# Patient Record
Sex: MALE | NOT HISPANIC OR LATINO | Employment: UNEMPLOYED | URBAN - METROPOLITAN AREA
[De-identification: names, ages, dates, MRNs, and addresses within clinical notes are randomized per-mention and may not be internally consistent; named-entity substitution may affect disease eponyms.]

---

## 2018-04-12 ENCOUNTER — APPOINTMENT (OUTPATIENT)
Dept: CT IMAGING | Facility: HOSPITAL | Age: 53
End: 2018-04-12

## 2018-04-12 ENCOUNTER — HOSPITAL ENCOUNTER (EMERGENCY)
Facility: HOSPITAL | Age: 53
Discharge: HOME | End: 2018-04-12
Attending: EMERGENCY MEDICINE | Admitting: EMERGENCY MEDICINE

## 2018-04-12 VITALS
HEART RATE: 64 BPM | RESPIRATION RATE: 18 BRPM | OXYGEN SATURATION: 97 % | TEMPERATURE: 98.7 F | SYSTOLIC BLOOD PRESSURE: 105 MMHG | DIASTOLIC BLOOD PRESSURE: 71 MMHG | WEIGHT: 140 LBS | BODY MASS INDEX: 21.22 KG/M2 | HEIGHT: 68 IN

## 2018-04-12 DIAGNOSIS — R11.10 VOMITING AND DIARRHEA: ICD-10-CM

## 2018-04-12 DIAGNOSIS — R10.13 EPIGASTRIC ABDOMINAL PAIN: Primary | ICD-10-CM

## 2018-04-12 DIAGNOSIS — R19.7 VOMITING AND DIARRHEA: ICD-10-CM

## 2018-04-12 LAB
ALBUMIN SERPL-MCNC: 3.9 G/DL (ref 3.5–5.2)
ALBUMIN/GLOB SERPL: 1.1 G/DL
ALP SERPL-CCNC: 84 U/L (ref 39–117)
ALT SERPL W P-5'-P-CCNC: 19 U/L (ref 1–41)
ANION GAP SERPL CALCULATED.3IONS-SCNC: 11.2 MMOL/L
AST SERPL-CCNC: 11 U/L (ref 1–40)
BASOPHILS # BLD AUTO: 0.02 10*3/MM3 (ref 0–0.2)
BASOPHILS NFR BLD AUTO: 0.2 % (ref 0–1.5)
BILIRUB SERPL-MCNC: 0.4 MG/DL (ref 0.1–1.2)
BILIRUB UR QL STRIP: NEGATIVE
BUN BLD-MCNC: 9 MG/DL (ref 6–20)
BUN/CREAT SERPL: 14.1 (ref 7–25)
CALCIUM SPEC-SCNC: 9.4 MG/DL (ref 8.6–10.5)
CHLORIDE SERPL-SCNC: 103 MMOL/L (ref 98–107)
CLARITY UR: CLEAR
CO2 SERPL-SCNC: 26.8 MMOL/L (ref 22–29)
COLOR UR: YELLOW
CREAT BLD-MCNC: 0.64 MG/DL (ref 0.76–1.27)
DEPRECATED RDW RBC AUTO: 40.8 FL (ref 37–54)
EOSINOPHIL # BLD AUTO: 0.34 10*3/MM3 (ref 0–0.7)
EOSINOPHIL NFR BLD AUTO: 3.8 % (ref 0.3–6.2)
ERYTHROCYTE [DISTWIDTH] IN BLOOD BY AUTOMATED COUNT: 12.3 % (ref 11.5–14.5)
GFR SERPL CREATININE-BSD FRML MDRD: 131 ML/MIN/1.73
GFR SERPL CREATININE-BSD FRML MDRD: >150 ML/MIN/1.73
GLOBULIN UR ELPH-MCNC: 3.6 GM/DL
GLUCOSE BLD-MCNC: 95 MG/DL (ref 65–99)
GLUCOSE UR STRIP-MCNC: NEGATIVE MG/DL
HCT VFR BLD AUTO: 52.6 % (ref 40.4–52.2)
HGB BLD-MCNC: 16.9 G/DL (ref 13.7–17.6)
HGB UR QL STRIP.AUTO: NEGATIVE
IMM GRANULOCYTES # BLD: 0.02 10*3/MM3 (ref 0–0.03)
IMM GRANULOCYTES NFR BLD: 0.2 % (ref 0–0.5)
KETONES UR QL STRIP: NEGATIVE
LEUKOCYTE ESTERASE UR QL STRIP.AUTO: NEGATIVE
LIPASE SERPL-CCNC: 43 U/L (ref 13–60)
LYMPHOCYTES # BLD AUTO: 2.35 10*3/MM3 (ref 0.9–4.8)
LYMPHOCYTES NFR BLD AUTO: 26.1 % (ref 19.6–45.3)
MCH RBC QN AUTO: 29.2 PG (ref 27–32.7)
MCHC RBC AUTO-ENTMCNC: 32.1 G/DL (ref 32.6–36.4)
MCV RBC AUTO: 90.8 FL (ref 79.8–96.2)
MONOCYTES # BLD AUTO: 1.15 10*3/MM3 (ref 0.2–1.2)
MONOCYTES NFR BLD AUTO: 12.8 % (ref 5–12)
NEUTROPHILS # BLD AUTO: 5.13 10*3/MM3 (ref 1.9–8.1)
NEUTROPHILS NFR BLD AUTO: 56.9 % (ref 42.7–76)
NITRITE UR QL STRIP: NEGATIVE
PH UR STRIP.AUTO: 8 [PH] (ref 5–8)
PLATELET # BLD AUTO: 125 10*3/MM3 (ref 140–500)
PMV BLD AUTO: 10.9 FL (ref 6–12)
POTASSIUM BLD-SCNC: 5 MMOL/L (ref 3.5–5.2)
PROT SERPL-MCNC: 7.5 G/DL (ref 6–8.5)
PROT UR QL STRIP: NEGATIVE
RBC # BLD AUTO: 5.79 10*6/MM3 (ref 4.6–6)
SODIUM BLD-SCNC: 141 MMOL/L (ref 136–145)
SP GR UR STRIP: 1.01 (ref 1–1.03)
TROPONIN T SERPL-MCNC: <0.01 NG/ML (ref 0–0.03)
UROBILINOGEN UR QL STRIP: NORMAL
WBC NRBC COR # BLD: 9.01 10*3/MM3 (ref 4.5–10.7)

## 2018-04-12 PROCEDURE — 83690 ASSAY OF LIPASE: CPT | Performed by: EMERGENCY MEDICINE

## 2018-04-12 PROCEDURE — 93010 ELECTROCARDIOGRAM REPORT: CPT | Performed by: INTERNAL MEDICINE

## 2018-04-12 PROCEDURE — 80053 COMPREHEN METABOLIC PANEL: CPT | Performed by: EMERGENCY MEDICINE

## 2018-04-12 PROCEDURE — 74177 CT ABD & PELVIS W/CONTRAST: CPT

## 2018-04-12 PROCEDURE — 84484 ASSAY OF TROPONIN QUANT: CPT | Performed by: EMERGENCY MEDICINE

## 2018-04-12 PROCEDURE — 25010000002 IOPAMIDOL 61 % SOLUTION: Performed by: EMERGENCY MEDICINE

## 2018-04-12 PROCEDURE — 93005 ELECTROCARDIOGRAM TRACING: CPT | Performed by: EMERGENCY MEDICINE

## 2018-04-12 PROCEDURE — 85025 COMPLETE CBC W/AUTO DIFF WBC: CPT | Performed by: EMERGENCY MEDICINE

## 2018-04-12 PROCEDURE — 25010000002 HYDROMORPHONE PER 4 MG: Performed by: EMERGENCY MEDICINE

## 2018-04-12 PROCEDURE — 81003 URINALYSIS AUTO W/O SCOPE: CPT | Performed by: EMERGENCY MEDICINE

## 2018-04-12 PROCEDURE — 99285 EMERGENCY DEPT VISIT HI MDM: CPT

## 2018-04-12 PROCEDURE — 96374 THER/PROPH/DIAG INJ IV PUSH: CPT

## 2018-04-12 PROCEDURE — 96375 TX/PRO/DX INJ NEW DRUG ADDON: CPT

## 2018-04-12 PROCEDURE — 25010000002 ONDANSETRON PER 1 MG: Performed by: EMERGENCY MEDICINE

## 2018-04-12 RX ORDER — OMEPRAZOLE 40 MG/1
40 CAPSULE, DELAYED RELEASE ORAL DAILY
Qty: 30 CAPSULE | Refills: 0 | Status: SHIPPED | OUTPATIENT
Start: 2018-04-12 | End: 2021-11-15

## 2018-04-12 RX ORDER — ATENOLOL 25 MG/1
25 TABLET ORAL DAILY
COMMUNITY

## 2018-04-12 RX ORDER — HYDROCODONE BITARTRATE AND ACETAMINOPHEN 5; 325 MG/1; MG/1
1-2 TABLET ORAL EVERY 6 HOURS PRN
Qty: 15 TABLET | Refills: 0 | Status: SHIPPED | OUTPATIENT
Start: 2018-04-12

## 2018-04-12 RX ORDER — ONDANSETRON 2 MG/ML
4 INJECTION INTRAMUSCULAR; INTRAVENOUS ONCE
Status: COMPLETED | OUTPATIENT
Start: 2018-04-12 | End: 2018-04-12

## 2018-04-12 RX ORDER — HYDROMORPHONE HYDROCHLORIDE 1 MG/ML
0.5 INJECTION, SOLUTION INTRAMUSCULAR; INTRAVENOUS; SUBCUTANEOUS ONCE
Status: COMPLETED | OUTPATIENT
Start: 2018-04-12 | End: 2018-04-12

## 2018-04-12 RX ORDER — SIMVASTATIN 10 MG
10 TABLET ORAL NIGHTLY
COMMUNITY

## 2018-04-12 RX ORDER — ALUMINA, MAGNESIA, AND SIMETHICONE 2400; 2400; 240 MG/30ML; MG/30ML; MG/30ML
15 SUSPENSION ORAL ONCE
Status: COMPLETED | OUTPATIENT
Start: 2018-04-12 | End: 2018-04-12

## 2018-04-12 RX ORDER — ONDANSETRON 4 MG/1
4 TABLET, ORALLY DISINTEGRATING ORAL 4 TIMES DAILY PRN
Qty: 10 TABLET | Refills: 0 | Status: SHIPPED | OUTPATIENT
Start: 2018-04-12

## 2018-04-12 RX ADMIN — IOPAMIDOL 93 ML: 612 INJECTION, SOLUTION INTRAVENOUS at 13:10

## 2018-04-12 RX ADMIN — HYDROMORPHONE HYDROCHLORIDE 0.5 MG: 1 INJECTION, SOLUTION INTRAMUSCULAR; INTRAVENOUS; SUBCUTANEOUS at 12:16

## 2018-04-12 RX ADMIN — ALUMINUM HYDROXIDE, MAGNESIUM HYDROXIDE, AND DIMETHICONE 15 ML: 400; 400; 40 SUSPENSION ORAL at 14:25

## 2018-04-12 RX ADMIN — SODIUM CHLORIDE 1000 ML: 9 INJECTION, SOLUTION INTRAVENOUS at 12:12

## 2018-04-12 RX ADMIN — LIDOCAINE HYDROCHLORIDE 15 ML: 20 SOLUTION ORAL; TOPICAL at 14:24

## 2018-04-12 RX ADMIN — ONDANSETRON 4 MG: 2 INJECTION INTRAMUSCULAR; INTRAVENOUS at 12:13

## 2018-04-12 NOTE — ED PROVIDER NOTES
" EMERGENCY DEPARTMENT ENCOUNTER    Room Number:  19/19  Date seen:  4/12/2018  Time seen: 11:55 AM  PCP: Geri Kelley MD  Historian: Patient, Paramedics  History Limited By: Language Barrier (translation service used)      HPI:  Chief Complaint: Abdominal Pain  Context: Kenisha Esteban is a 53 y.o. male who presents to the ED c/o intermittent episodes of periumbilical abdominal pain onset about 10 days ago. Pt's abdominal pain has gradually worsened since initial onset. Pt has also had N/V/D and decreased appetite/oral intake. Pt has had no abdominal surgeries performed in the past. There are no other complaints at this time.     Pain Location: Periumbilical region of the abdomen  Radiation: None  Quality: \"aching\"  Intensity/Severity: Moderate  Duration: Onset about 10 days ago  Onset quality: Gradual  Timing: Intermittent  Progression: Worsening   Aggravating Factors: Nothing  Alleviating Factors: Nothing  Previous Episodes: Unknown   Treatment before arrival: Unknown  Associated Symptoms: N/V/D, decreased appetite/oral intake        MEDICAL RECORD REVIEW    Pt has had no recent visits to [facility] ED.             PAST MEDICAL HISTORY  Active Ambulatory Problems     Diagnosis Date Noted   • No Active Ambulatory Problems     Resolved Ambulatory Problems     Diagnosis Date Noted   • No Resolved Ambulatory Problems     Past Medical History:   Diagnosis Date   • Myocardial infarction          PAST SURGICAL HISTORY  Past Surgical History:   Procedure Laterality Date   • CORONARY ANGIOPLASTY WITH STENT PLACEMENT           FAMILY HISTORY  History reviewed. No pertinent family history.      SOCIAL HISTORY  Social History     Social History   • Marital status:      Spouse name: N/A   • Number of children: N/A   • Years of education: N/A     Occupational History   • Not on file.     Social History Main Topics   • Smoking status: Current Every Day Smoker     Types: Cigarettes   • Smokeless tobacco: Not on file   • " Alcohol use No   • Drug use: Unknown   • Sexual activity: Not on file     Other Topics Concern   • Not on file     Social History Narrative   • No narrative on file         ALLERGIES  Review of patient's allergies indicates no known allergies.        REVIEW OF SYSTEMS  Review of Systems   Unable to perform ROS: Other (Language Barrier (translation service used))   Constitutional: Positive for appetite change (decreased appetite/decreased oral intake).   Gastrointestinal: Positive for abdominal pain, diarrhea, nausea and vomiting.            PHYSICAL EXAM  ED Triage Vitals   Temp Heart Rate Resp BP SpO2   04/12/18 1155 04/12/18 1151 04/12/18 1152 04/12/18 1152 04/12/18 1152   98.7 °F (37.1 °C) 80 22 136/85 95 %      Temp src Heart Rate Source Patient Position BP Location FiO2 (%)   04/12/18 1155 04/12/18 1151 -- -- --   Oral Monitor            Physical Exam   Constitutional: He is oriented to person, place, and time. No distress.   HENT:   Head: Normocephalic.   Mouth/Throat: Mucous membranes are normal.   Eyes: EOM are normal. Pupils are equal, round, and reactive to light.   Neck: Normal range of motion. Neck supple.   Cardiovascular: Normal rate, regular rhythm and normal heart sounds.    Pulmonary/Chest: Effort normal and breath sounds normal. No respiratory distress. He has no decreased breath sounds. He has no wheezes. He has no rhonchi. He has no rales.   Abdominal: Soft. There is tenderness in the periumbilical area. There is no rebound and no guarding.   Musculoskeletal: Normal range of motion.   Neurological: He is alert and oriented to person, place, and time. He has normal sensation.   Skin: Skin is warm and dry.   Psychiatric: Mood and affect normal.   Nursing note and vitals reviewed.          LAB RESULTS  Recent Results (from the past 24 hour(s))   Comprehensive Metabolic Panel    Collection Time: 04/12/18 12:08 PM   Result Value Ref Range    Glucose 95 65 - 99 mg/dL    BUN 9 6 - 20 mg/dL    Creatinine  0.64 (L) 0.76 - 1.27 mg/dL    Sodium 141 136 - 145 mmol/L    Potassium 5.0 3.5 - 5.2 mmol/L    Chloride 103 98 - 107 mmol/L    CO2 26.8 22.0 - 29.0 mmol/L    Calcium 9.4 8.6 - 10.5 mg/dL    Total Protein 7.5 6.0 - 8.5 g/dL    Albumin 3.90 3.50 - 5.20 g/dL    ALT (SGPT) 19 1 - 41 U/L    AST (SGOT) 11 1 - 40 U/L    Alkaline Phosphatase 84 39 - 117 U/L    Total Bilirubin 0.4 0.1 - 1.2 mg/dL    eGFR Non African Amer 131 >60 mL/min/1.73    eGFR  African Amer >150 >60 mL/min/1.73    Globulin 3.6 gm/dL    A/G Ratio 1.1 g/dL    BUN/Creatinine Ratio 14.1 7.0 - 25.0    Anion Gap 11.2 mmol/L   Lipase    Collection Time: 04/12/18 12:08 PM   Result Value Ref Range    Lipase 43 13 - 60 U/L   Troponin    Collection Time: 04/12/18 12:08 PM   Result Value Ref Range    Troponin T <0.010 0.000 - 0.030 ng/mL   CBC Auto Differential    Collection Time: 04/12/18 12:08 PM   Result Value Ref Range    WBC 9.01 4.50 - 10.70 10*3/mm3    RBC 5.79 4.60 - 6.00 10*6/mm3    Hemoglobin 16.9 13.7 - 17.6 g/dL    Hematocrit 52.6 (H) 40.4 - 52.2 %    MCV 90.8 79.8 - 96.2 fL    MCH 29.2 27.0 - 32.7 pg    MCHC 32.1 (L) 32.6 - 36.4 g/dL    RDW 12.3 11.5 - 14.5 %    RDW-SD 40.8 37.0 - 54.0 fl    MPV 10.9 6.0 - 12.0 fL    Platelets 125 (L) 140 - 500 10*3/mm3    Neutrophil % 56.9 42.7 - 76.0 %    Lymphocyte % 26.1 19.6 - 45.3 %    Monocyte % 12.8 (H) 5.0 - 12.0 %    Eosinophil % 3.8 0.3 - 6.2 %    Basophil % 0.2 0.0 - 1.5 %    Immature Grans % 0.2 0.0 - 0.5 %    Neutrophils, Absolute 5.13 1.90 - 8.10 10*3/mm3    Lymphocytes, Absolute 2.35 0.90 - 4.80 10*3/mm3    Monocytes, Absolute 1.15 0.20 - 1.20 10*3/mm3    Eosinophils, Absolute 0.34 0.00 - 0.70 10*3/mm3    Basophils, Absolute 0.02 0.00 - 0.20 10*3/mm3    Immature Grans, Absolute 0.02 0.00 - 0.03 10*3/mm3   Urinalysis With / Microscopic If Indicated - Urine, Clean Catch    Collection Time: 04/12/18  1:27 PM   Result Value Ref Range    Color, UA Yellow Yellow, Straw    Appearance, UA Clear Clear    pH, UA  8.0 5.0 - 8.0    Specific Gravity, UA 1.008 1.005 - 1.030    Glucose, UA Negative Negative    Ketones, UA Negative Negative    Bilirubin, UA Negative Negative    Blood, UA Negative Negative    Protein, UA Negative Negative    Leuk Esterase, UA Negative Negative    Nitrite, UA Negative Negative    Urobilinogen, UA 0.2 E.U./dL 0.2 - 1.0 E.U./dL       Ordered the above labs and reviewed the results.        RADIOLOGY  CT Abdomen Pelvis With Contrast   Final Result   Unremarkable CT scan of the abdomen and pelvis except for   evidence of atherosclerotic disease as noted.       This report was finalized on 4/12/2018 1:44 PM by Dr. Nick Merritt MD.                 Ordered the above noted radiological studies. Reviewed by me in PACS. Spoke with Dr. Merritt (radiologist) regarding CT Abd scan results.        PROCEDURES  Procedures        EKG:           EKG time: 11:52 AM  Rhythm/Rate: NSR rate 74  P waves and TX: Normal P waves  QRS, axis: LVH   ST and T waves: Normal ST     Interpreted Contemporaneously by me, independently viewed  No old EKG is available for comparison           PROGRESS AND CONSULTS  ED Course   Comment By Time   2:34 PM  History is difficult as he has no family here and does not speak any English.  History through  phone.  No records here or [facility]. States he has had this in the past and endoscopy showed an inflammed stomach.  His workup here is normal and does not look like someone who has been vomiting for 10 days.  Most likely secondary to gastritis.  Given fluids here.  Will discharge with GI follow up.   Cristian Ly MD 04/12 0066     12:02 PM:  UA and blood work ordered for further evaluation. IV fluids ordered to hydrate pt. Dilaudid and Zofran ordered to treat for abdominal pain and nausea.     12:53 PM:  CT Abd ordered for further evaluation.     1:44 PM:  GI cocktail ordered to treat for abdominal discomfort.     2:25 PM:  Rechecked pt. Informed pt (via translation service)  that pt's CMP is relatively unremarkable. WBC is WNL. CT Abd is negative acute. Pt will be prescribed with rx for PPI, antiemetic, and small amount of pain medicine for discomfort. Pt was advised to f/u with PMD; pt will be provided with f/u referral to GI. RTER warnings given. Pt agrees with plan for discharge.            MEDICAL DECISION MAKING      MDM  Number of Diagnoses or Management Options  Epigastric abdominal pain:   Vomiting and diarrhea:      Amount and/or Complexity of Data Reviewed  Clinical lab tests: reviewed and ordered (Troponin is negative.)  Tests in the radiology section of CPT®: ordered and reviewed (CT Abd:  Unremarkable CT scan of the abdomen and pelvis except for  evidence of atherosclerotic disease as noted.)  Tests in the medicine section of CPT®: ordered and reviewed (EKG interpreted.   )  Discussion of test results with the performing providers: yes (CT Abd results d/w radiologist.   )    Patient Progress  Patient progress: stable             DIAGNOSIS  Final diagnoses:   Epigastric abdominal pain   Vomiting and diarrhea         DISPOSITION  Pt discharged.      DISCHARGE    Patient discharged in stable condition.    Reviewed implications of results, diagnosis, meds, responsibility to follow up, warning signs and symptoms of possible worsening, potential complications and reasons to return to ER.    Patient/Family voiced understanding of above instructions.    Discussed plan for discharge, as there is no emergent indication for admission. Pt/family is agreeable and understands need for follow up and repeat testing. Pt is aware that discharge does not mean that nothing is wrong but it indicates no emergency is present that requires admission and they must continue care with follow-up as given below or physician of their choice.     FOLLOW-UP  Geri Kelley MD  [address] [facility] [address] [phone]    In 2 days      Libia Damian MD  [address]  [place] [address]  [phone]    Schedule an appointment as soon as possible for a visit           Medication List      New Prescriptions    HYDROcodone-acetaminophen 5-325 MG per tablet  Commonly known as:  NORCO  Take 1-2 tablets by mouth Every 6 (Six) Hours As Needed for Moderate Pain   .     omeprazole 40 MG capsule  Commonly known as:  priLOSEC  Take 1 capsule by mouth Daily.     ondansetron ODT 4 MG disintegrating tablet  Commonly known as:  ZOFRAN-ODT  Take 1 tablet by mouth 4 (Four) Times a Day As Needed for Nausea or   Vomiting.                        Latest Documented Vital Signs:  As of 3:32 PM  BP- 105/71 HR- 64 Temp- 98.7 °F (37.1 °C) (Oral) O2 sat- 97%        --  Documentation assistance provided by tevin Avilez for Dr. Malachi MD.  Information recorded by the scribe was done at my direction and has been verified and validated by me.               Cortez Avilez  04/12/18 [id number]       Cristian Ly MD  04/12/18 [id number]

## 2018-04-12 NOTE — ED TRIAGE NOTES
Pt to ED via EMS from home, EMS reports pt c/o periumbilical abdominal pain x1 week, onset of vomiting x24 hours, no oral intake without vomiting x10 days. Per EMS noted pulsating area to left of umbilicus.

## 2018-04-23 ENCOUNTER — OFFICE VISIT (OUTPATIENT)
Dept: GASTROENTEROLOGY | Facility: CLINIC | Age: 53
End: 2018-04-23

## 2018-04-23 VITALS
HEIGHT: 68 IN | BODY MASS INDEX: 26.86 KG/M2 | DIASTOLIC BLOOD PRESSURE: 80 MMHG | WEIGHT: 177.2 LBS | SYSTOLIC BLOOD PRESSURE: 116 MMHG | TEMPERATURE: 98.3 F

## 2018-04-23 DIAGNOSIS — R14.0 ABDOMINAL BLOATING: Primary | ICD-10-CM

## 2018-04-23 DIAGNOSIS — R10.30 LOWER ABDOMINAL PAIN: ICD-10-CM

## 2018-04-23 DIAGNOSIS — R10.10 PAIN OF UPPER ABDOMEN: ICD-10-CM

## 2018-04-23 DIAGNOSIS — R11.2 NON-INTRACTABLE VOMITING WITH NAUSEA, UNSPECIFIED VOMITING TYPE: ICD-10-CM

## 2018-04-23 PROCEDURE — 99204 OFFICE O/P NEW MOD 45 MIN: CPT | Performed by: INTERNAL MEDICINE

## 2018-04-23 RX ORDER — DICYCLOMINE HYDROCHLORIDE 10 MG/1
CAPSULE ORAL
Refills: 0 | COMMUNITY
Start: 2018-04-14

## 2018-04-23 NOTE — PROGRESS NOTES
Chief Complaint   Patient presents with   • Vomiting   • Abdominal Pain       Kenisha Esteban is a 53 y.o. male who presents with Abdominal pain for 2 weeks, constipation, vomiting    EGD 5 years ago normal  Routine laboratories and CAT scan in the ER normal      Vomiting    This is a new problem. The current episode started 1 to 4 weeks ago. The problem occurs 2 to 4 times per day. The problem has been waxing and waning. The emesis has an appearance of stomach contents. Associated symptoms include abdominal pain, dizziness, sweats and weight loss. He has tried nothing for the symptoms.   Abdominal Pain   This is a new problem. The current episode started 1 to 4 weeks ago. The onset quality is sudden. The problem occurs intermittently. The problem has been waxing and waning. The pain is located in the RLQ and epigastric region. The pain is at a severity of 4/10. The pain is moderate. The quality of the pain is aching, cramping, dull and colicky. The abdominal pain does not radiate. Associated symptoms include anorexia, belching, constipation, flatus, nausea, vomiting and weight loss. The pain is aggravated by eating. The pain is relieved by nothing. He has tried proton pump inhibitors and oral narcotic analgesics for the symptoms. The treatment provided no relief. Prior diagnostic workup includes CT scan. There is no history of abdominal surgery, colon cancer, Crohn's disease, gallstones, GERD, irritable bowel syndrome, pancreatitis, PUD or ulcerative colitis.       Past Medical History:   Diagnosis Date   • Hypertension    • Myocardial infarction        Past Surgical History:   Procedure Laterality Date   • CORONARY ANGIOPLASTY WITH STENT PLACEMENT     • UPPER GASTROINTESTINAL ENDOSCOPY           Current Outpatient Prescriptions:   •  ASPIRIN 81 PO, Take  by mouth., Disp: , Rfl:   •  atenolol (TENORMIN) 25 MG tablet, Take 25 mg by mouth Daily., Disp: , Rfl:   •  dicyclomine (BENTYL) 10 MG capsule, TK 1 C PO  BID PRN, Disp: , Rfl: 0  •  omeprazole (priLOSEC) 40 MG capsule, Take 1 capsule by mouth Daily., Disp: 30 capsule, Rfl: 0  •  ondansetron ODT (ZOFRAN-ODT) 4 MG disintegrating tablet, Take 1 tablet by mouth 4 (Four) Times a Day As Needed for Nausea or Vomiting., Disp: 10 tablet, Rfl: 0  •  HYDROcodone-acetaminophen (NORCO) 5-325 MG per tablet, Take 1-2 tablets by mouth Every 6 (Six) Hours As Needed for Moderate Pain ., Disp: 15 tablet, Rfl: 0  •  simvastatin (ZOCOR) 10 MG tablet, Take 10 mg by mouth Every Night., Disp: , Rfl:     No Known Allergies    Social History     Social History   • Marital status:      Spouse name: N/A   • Number of children: N/A   • Years of education: N/A     Occupational History   • Not on file.     Social History Main Topics   • Smoking status: Current Every Day Smoker     Packs/day: 1.00     Types: Cigarettes   • Smokeless tobacco: Not on file   • Alcohol use No   • Drug use: Unknown   • Sexual activity: Not on file     Other Topics Concern   • Not on file     Social History Narrative   • No narrative on file       History reviewed. No pertinent family history.    Review of Systems   Constitutional: Positive for weight loss.   Gastrointestinal: Positive for abdominal pain, anorexia, constipation, flatus, nausea and vomiting.   Neurological: Positive for dizziness.   All other systems reviewed and are negative.      Vitals:    04/23/18 1532   BP: 116/80   Temp: 98.3 °F (36.8 °C)       Physical Exam   Constitutional: He is oriented to person, place, and time. He appears well-developed and well-nourished.   HENT:   Head: Normocephalic and atraumatic.   Eyes: Conjunctivae and EOM are normal.   Neck: Normal range of motion. No tracheal deviation present.   Cardiovascular: Normal rate and regular rhythm.    Pulmonary/Chest: Effort normal and breath sounds normal. No respiratory distress.   Abdominal: Soft. Bowel sounds are normal. He exhibits no distension and no mass. There is  tenderness. There is no rebound and no guarding.   Tender in the right lower quadrant and epigastric region without guarding or rebound   Musculoskeletal: Normal range of motion.   Neurological: He is alert and oriented to person, place, and time.   Skin: Skin is warm and dry.   Psychiatric: He has a normal mood and affect. Judgment normal.   Nursing note and vitals reviewed.      CT ABDOMEN PELVIS W CONTRAST-     CLINICAL HISTORY: Abdominal pain.     TECHNIQUE: Spiral CT images were obtained through the abdomen and pelvis  with IV contrast only and were reconstructed in 3 mm thick axial slices.     Radiation dose reduction techniques were utilized, including automated  exposure control and exposure modulation based on body size.     COMPARISON: None     FINDINGS: The liver, spleen, pancreas, kidneys, and adrenal glands  appear within normal limits. The liver, spleen, pancreas, kidneys, and  adrenal glands appear within normal limits. The stomach and small and  large bowel are unremarkable. There is no bowel distention. There are no  abnormal masses or fluid collections in the abdomen or pelvis. No  hernias are identified. Several calcified atherosclerotic plaques are  noted in the common and internal iliac arteries.     IMPRESSION:  Unremarkable CT scan of the abdomen and pelvis except for  evidence of atherosclerotic disease as noted.     This report was finalized on 4/12/2018 1:44 PM by Dr. Nick Merritt MD    I have visualized the imaging myself and agree there is no acute process in the abdomen, specifically no evidence of ulceration or diverticulitis or colitis    Problem list    Abdominal pain epigastric region  Abdominal pain right lower quadrant  Nausea and vomiting  Weight loss  Chronic constipation      Assessment/Plan    Plan for EGD and colonoscopy based on the above issues  Continue PPI as I think peptic ulcer disease is a likely possibility here  I will likely start a medication for chronic  constipation after the scopes  He may need gallbladder workup  He should avoid narcotics  He continues Bentyl as needed      Begin a fiber supplement.  Benefiber, Citrucel or Metamucil is acceptable.  Fiber gummies are also acceptable.  Please take 1 dose of fiber in the morning and 1 in the evening for 4 weeks and increase to 2 doses of fiber in the morning and 2 in the evening after that. Please  try to maintain a high-fiber diet.  We obtain 10 g of fiber from a high-fiber diet every day.  Women should consume a total of 25 grams of fiber a day, men 30 grams a day.  We can reach the total daily recommended dose of fiber a day utilizing the high-fiber diet and also fiber supplements.    Please begin a probiotic.  You can try activia yogurt, align, or florastor.  These can be found over-the-counter at a local grocery store.

## 2018-06-12 ENCOUNTER — OFFICE VISIT (OUTPATIENT)
Dept: GASTROENTEROLOGY | Facility: CLINIC | Age: 53
End: 2018-06-12

## 2018-06-12 VITALS
HEIGHT: 68 IN | WEIGHT: 176.2 LBS | DIASTOLIC BLOOD PRESSURE: 86 MMHG | SYSTOLIC BLOOD PRESSURE: 132 MMHG | BODY MASS INDEX: 26.7 KG/M2 | TEMPERATURE: 98 F

## 2018-06-12 DIAGNOSIS — R63.4 WEIGHT LOSS, ABNORMAL: ICD-10-CM

## 2018-06-12 DIAGNOSIS — K59.00 CONSTIPATION, UNSPECIFIED CONSTIPATION TYPE: ICD-10-CM

## 2018-06-12 DIAGNOSIS — R10.13 EPIGASTRIC PAIN: Primary | ICD-10-CM

## 2018-06-12 DIAGNOSIS — R11.15 INTRACTABLE CYCLICAL VOMITING WITH NAUSEA: ICD-10-CM

## 2018-06-12 PROCEDURE — 99214 OFFICE O/P EST MOD 30 MIN: CPT | Performed by: NURSE PRACTITIONER

## 2018-06-12 RX ORDER — POLYETHYLENE GLYCOL 3350 17 G/17G
17 POWDER, FOR SOLUTION ORAL DAILY
Qty: 225 G | Refills: 5 | Status: SHIPPED | OUTPATIENT
Start: 2018-06-12

## 2018-06-12 NOTE — PROGRESS NOTES
Chief Complaint   Patient presents with   • Bloated   • Abdominal Pain   • Heartburn   • Nausea   • Vomiting       Kenisha Esteban is a  53 y.o. male here for a follow up visit for abd pain and constipation.     HPI  54 yo kathleen fine presents today accompanied by his granddaughter for follow up visit for abd pain, constipation, N&V and weight loss. He is a patient of Dr. Smith. He was last seen in the office on 4/23/18. At that time given his symptoms and hx it was recommended he schedule EGD and Colonoscopy with Dr. Smith. Patient has hx AMI and CAD so he would first require cardiac clearance before proceeding with the scopes. He is here today to discuss this and to get started on some medication for his constipation. He admits he has been chronically constipated and does not currently take any meds to help him have a more normal, routine BM. He is still having upper and lower abd pain with occasional N&V. He has gained some weight recently. He has had a EGD in the past but cannot remember the year. He does not think he has ever had colonoscopy in the past. He denies any dysphagia, reflux, diarrhea, rectal bleeding or melena. He admits his appetite is good and his weight has improved.     Past Medical History:   Diagnosis Date   • Hyperlipidemia    • Hypertension    • Myocardial infarct 2008   • Myocardial infarction        Past Surgical History:   Procedure Laterality Date   • CORONARY ANGIOPLASTY WITH STENT PLACEMENT     • UPPER GASTROINTESTINAL ENDOSCOPY      inflammation per patient-  Al       Scheduled Meds:    Continuous Infusions:  No current facility-administered medications for this visit.     PRN Meds:.    No Known Allergies    Social History     Social History   • Marital status:      Spouse name: N/A   • Number of children: N/A   • Years of education: N/A     Occupational History   • Not on file.     Social History Main Topics   • Smoking status: Current Every Day Smoker     Packs/day:  1.00     Types: Cigarettes   • Smokeless tobacco: Never Used   • Alcohol use No   • Drug use: No   • Sexual activity: Not on file     Other Topics Concern   • Not on file     Social History Narrative   • No narrative on file       History reviewed. No pertinent family history.    Review of Systems   Constitutional: Negative for appetite change, chills, diaphoresis, fatigue, fever and unexpected weight change.   HENT: Negative for nosebleeds, postnasal drip, sore throat, trouble swallowing and voice change.    Respiratory: Negative for cough, choking, chest tightness, shortness of breath and wheezing.    Cardiovascular: Negative for chest pain.   Gastrointestinal: Positive for abdominal distention, abdominal pain, constipation and nausea. Negative for anal bleeding, blood in stool, diarrhea, rectal pain and vomiting.   Endocrine: Negative for polydipsia, polyphagia and polyuria.   Musculoskeletal: Negative for gait problem.   Skin: Negative for rash and wound.   Allergic/Immunologic: Negative for food allergies.   Neurological: Negative for dizziness, speech difficulty and light-headedness.   Psychiatric/Behavioral: Negative for confusion, self-injury, sleep disturbance and suicidal ideas.       Vitals:    06/12/18 1439   BP: 132/86   Temp: 98 °F (36.7 °C)       Physical Exam   Constitutional: He is oriented to person, place, and time. He appears well-developed and well-nourished. He does not appear ill. No distress.   HENT:   Head: Normocephalic.   Eyes: Pupils are equal, round, and reactive to light.   Cardiovascular: Normal rate, regular rhythm and normal heart sounds.    Pulmonary/Chest: Effort normal and breath sounds normal.   Abdominal: Soft. Bowel sounds are normal. He exhibits no distension and no mass. There is no hepatosplenomegaly. There is no tenderness. There is no rebound and no guarding. No hernia.   Musculoskeletal: Normal range of motion.   Neurological: He is alert and oriented to person, place,  and time.   Skin: Skin is warm and dry.   Psychiatric: He has a normal mood and affect. His speech is normal and behavior is normal. Judgment normal.       No images are attached to the encounter.    Assessment & Plan     1. Epigastric pain  - Case Request; Standing  - Case Request    2. Constipation, unspecified constipation type  - polyethylene glycol (MIRALAX) powder; Take 17 g by mouth Daily.  Dispense: 225 g; Refill: 5  - Case Request; Standing  - Case Request    3. Weight loss, abnormal  - Case Request; Standing  - Case Request    4. Intractable cyclical vomiting with nausea  - Case Request; Standing  - Case Request    Today's visit was translated by the [place]  phone. Epigastric pain is better on the omeprazole. Constipation is still an issue. Will start patient on daily miralax. Recommend EGD and Colonoscopy with Dr. Smith for further evaluation. He has hx AMI with CAD and will need cardiac clearance before scheduling scopes. I discussed potential risks of the procedures with the patient and the  on the phone and patient was agreeable to proceed. Will having nursing obtain cardiac clearance and call patient to schedule scopes. Continue the omeprazole. I will order the miralax. Patient to follow up with Dr. Smith after the scopes.

## 2018-06-13 ENCOUNTER — TELEPHONE (OUTPATIENT)
Dept: GASTROENTEROLOGY | Facility: CLINIC | Age: 53
End: 2018-06-13

## 2018-06-13 NOTE — TELEPHONE ENCOUNTER
Call placed to Dr. Darci Bryson's office, left message with his LPN, Idania. Advised we needed to get cardiac clearance from Dr. Bryson before Dr. Smith could proceed with the patient's EGD and colonoscopy. Will await phone call back.

## 2018-06-13 NOTE — TELEPHONE ENCOUNTER
----- Message from ESTRELLA Moreno sent at 6/12/2018  3:02 PM EDT -----  Patient is not english speaking but his granddaughter is. He needs to be scheduled for EGD and Colonoscopy with Dr. Smith. But due to his cardiac hx will need cardiac clearance. Can you please obtain clearance from his cardiologist and then call patient's granddaughter to schedule and give instructions. I see records from Dr. Darci Bryson with [facility]. I am not sure if this is his current cardiologist or not. Patient did not know the name. He is only on aspirin. Thanks so much.

## 2018-06-14 NOTE — TELEPHONE ENCOUNTER
Per [place], telephone :  TRAVON WITH [facility]  SHE WILL FAX THE CLEARANCE ON THIS PT. ALSO SHE SAID IT SHOULD BE IN THE CARE EVERYWHERE.

## 2018-06-14 NOTE — TELEPHONE ENCOUNTER
OK. I don't see anything in the chart under care everywhere and its not in media tab yet. Guess we can just keep an eye out for it when its faxed. Thanks.

## 2018-06-28 NOTE — TELEPHONE ENCOUNTER
Called placed to [facility], Dr. Darci Bryson.  Left message with Idania advising we had never received cardiac clearance for the patient, request the clearance approval be faxed to [phone] and if she had questions to call [phone].

## 2021-08-26 ENCOUNTER — INPATIENT HOSPITAL (OUTPATIENT)
Dept: URBAN - METROPOLITAN AREA HOSPITAL 107 | Facility: HOSPITAL | Age: 56
End: 2021-08-26
Payer: COMMERCIAL

## 2021-08-26 DIAGNOSIS — R19.7 DIARRHEA, UNSPECIFIED: ICD-10-CM

## 2021-08-26 DIAGNOSIS — R10.33 PERIUMBILICAL PAIN: ICD-10-CM

## 2021-08-26 DIAGNOSIS — K21.9 GASTRO-ESOPHAGEAL REFLUX DISEASE WITHOUT ESOPHAGITIS: ICD-10-CM

## 2021-08-26 PROCEDURE — 99223 1ST HOSP IP/OBS HIGH 75: CPT | Performed by: INTERNAL MEDICINE

## 2021-08-27 ENCOUNTER — INPATIENT HOSPITAL (OUTPATIENT)
Dept: URBAN - METROPOLITAN AREA HOSPITAL 107 | Facility: HOSPITAL | Age: 56
End: 2021-08-27
Payer: COMMERCIAL

## 2021-08-27 DIAGNOSIS — R19.7 DIARRHEA, UNSPECIFIED: ICD-10-CM

## 2021-08-27 DIAGNOSIS — R10.33 PERIUMBILICAL PAIN: ICD-10-CM

## 2021-08-27 PROCEDURE — 99232 SBSQ HOSP IP/OBS MODERATE 35: CPT | Performed by: PHYSICIAN ASSISTANT

## 2021-08-28 PROCEDURE — 99231 SBSQ HOSP IP/OBS SF/LOW 25: CPT | Performed by: INTERNAL MEDICINE

## 2021-11-15 ENCOUNTER — OFFICE VISIT (OUTPATIENT)
Dept: GASTROENTEROLOGY | Facility: CLINIC | Age: 56
End: 2021-11-15

## 2021-11-15 ENCOUNTER — TELEPHONE (OUTPATIENT)
Dept: GASTROENTEROLOGY | Facility: CLINIC | Age: 56
End: 2021-11-15

## 2021-11-15 VITALS — TEMPERATURE: 98.2 F | HEIGHT: 68 IN | WEIGHT: 193.6 LBS | BODY MASS INDEX: 29.34 KG/M2

## 2021-11-15 DIAGNOSIS — R19.7 DIARRHEA, UNSPECIFIED TYPE: ICD-10-CM

## 2021-11-15 DIAGNOSIS — R10.10 PAIN OF UPPER ABDOMEN: Primary | ICD-10-CM

## 2021-11-15 DIAGNOSIS — R11.2 NAUSEA AND VOMITING, INTRACTABILITY OF VOMITING NOT SPECIFIED, UNSPECIFIED VOMITING TYPE: ICD-10-CM

## 2021-11-15 DIAGNOSIS — R19.4 CHANGE IN BOWEL HABITS: ICD-10-CM

## 2021-11-15 PROCEDURE — 99204 OFFICE O/P NEW MOD 45 MIN: CPT | Performed by: NURSE PRACTITIONER

## 2021-11-15 RX ORDER — LISINOPRIL 5 MG/1
5 TABLET ORAL DAILY
COMMUNITY
Start: 2021-08-31

## 2021-11-15 RX ORDER — SPIRONOLACTONE 25 MG/1
12.5 TABLET ORAL DAILY
COMMUNITY
Start: 2021-09-22

## 2021-11-15 RX ORDER — FUROSEMIDE 40 MG/1
TABLET ORAL
COMMUNITY
Start: 2021-10-28

## 2021-11-15 RX ORDER — ISOSORBIDE MONONITRATE 30 MG/1
TABLET, EXTENDED RELEASE ORAL
COMMUNITY
Start: 2021-09-23

## 2021-11-15 RX ORDER — CARVEDILOL 6.25 MG/1
6.25 TABLET ORAL 2 TIMES DAILY WITH MEALS
COMMUNITY
Start: 2021-08-31

## 2021-11-15 RX ORDER — CLOPIDOGREL BISULFATE 75 MG/1
TABLET ORAL
COMMUNITY
Start: 2021-09-23

## 2021-11-15 RX ORDER — PANTOPRAZOLE SODIUM 40 MG/1
40 TABLET, DELAYED RELEASE ORAL 2 TIMES DAILY
Qty: 180 TABLET | Refills: 3 | Status: SHIPPED | OUTPATIENT
Start: 2021-11-15

## 2021-11-15 RX ORDER — OMEPRAZOLE 20 MG/1
20 CAPSULE, DELAYED RELEASE ORAL DAILY
COMMUNITY
End: 2021-11-15

## 2021-11-15 RX ORDER — NITROGLYCERIN 0.4 MG/1
TABLET SUBLINGUAL
COMMUNITY
Start: 2021-09-29

## 2021-11-15 RX ORDER — DICYCLOMINE HCL 20 MG
TABLET ORAL
COMMUNITY
Start: 2021-11-12

## 2021-11-15 RX ORDER — AZITHROMYCIN 250 MG/1
TABLET, FILM COATED ORAL
COMMUNITY
Start: 2021-10-07

## 2021-11-15 NOTE — PROGRESS NOTES
Chief Complaint   Patient presents with   • Abdominal Pain   • Heartburn       HPI    Kenisha Esteban is a  56 y.o. male here status care as a new patient (new over 3 years) for abdominal pain and heartburn.    Patient was last seen by Dr. Smith in 2018.  Recommended to have bidirectional endoscopic evaluation in 2018 but not completed.    Today he reports return of upper abdominal pain with episodes of nausea and vomiting, dyspepsia, and diarrhea since July.  Appetite waxes and wanes depending on the severity of his symptoms.  No weight loss.  He is on pantoprazole 40 mg once daily recently transition from omeprazole.  He takes Zofran as needed.  Bentyl as needed.  Uses Pepto-Bismol OTC for episodes of diarrhea.  When diarrhea occurs will have up to 4 episodes a day.  In between he will pass formed stools.  No rectal bleeding.  He has never had a colonoscopy.    Reported EGD 2013 while living in [place] questionable inflammation of the duodenum.    Complicating issues are the fact that he underwent a cardiac stent in September follows with the heart failure clinic and [facility].  On Plavix.  He is undergoing cardiac and pulmonary rehab.  He is currently wearing a LifeVest.  He has a follow-up with cardiac specialist Tuesday of next week.    CT of the abdomen and pelvis performed in August for same issue:    IMPRESSION:   1. Small bilateral pleural effusions. Subtle interstitial infiltrates are seen at the lung bases. Mild pulmonary edema versus congestive failure could have this appearance.   2. There is no evidence of an acute intracranial process. Gastrointestinal and urinary tract structures are within normal limits.     Hemogram 1 month ago H/H 14.7/44.8.. Normal LFTs..    Past Medical History:   Diagnosis Date   • Hyperlipidemia    • Hypertension    • Myocardial infarct (HCC) 2008   • Myocardial infarction (HCC)        Past Surgical History:   Procedure Laterality Date   • CORONARY ANGIOPLASTY  WITH STENT PLACEMENT     • UPPER GASTROINTESTINAL ENDOSCOPY  approx 2013    inflammation per patient-  Al       Scheduled Meds:     Continuous Infusions: No current facility-administered medications for this visit.      PRN Meds:     No Known Allergies    Social History     Socioeconomic History   • Marital status:    Tobacco Use   • Smoking status: Current Every Day Smoker     Packs/day: 1.00     Types: Cigarettes   • Smokeless tobacco: Never Used   Substance and Sexual Activity   • Alcohol use: No   • Drug use: No       History reviewed. No pertinent family history.    Review of Systems   Constitutional: Negative for activity change, appetite change, fatigue, fever and unexpected weight change.   HENT: Negative for trouble swallowing.    Gastrointestinal: Positive for abdominal pain, diarrhea, nausea and vomiting. Negative for abdominal distention, anal bleeding, blood in stool, constipation and rectal pain.       Vitals:    11/15/21 0924   Temp: 98.2 °F (36.8 °C)       Physical Exam  Constitutional:       Appearance: He is well-developed.   Abdominal:      General: Bowel sounds are normal. There is no distension.      Palpations: Abdomen is soft. There is no mass.      Tenderness: There is abdominal tenderness. There is no guarding.      Hernia: No hernia is present.          Comments: Tenderness noted in the area marked above.   Skin:     General: Skin is warm and dry.      Capillary Refill: Capillary refill takes less than 2 seconds.   Neurological:      Mental Status: He is alert and oriented to person, place, and time.   Psychiatric:         Behavior: Behavior normal.       Assessment    Diagnoses and all orders for this visit:    1. Pain of upper abdomen (Primary)  Overview:  Added automatically from request for surgery [id number]    Orders:  -     CBC & Differential  -     Comprehensive Metabolic Panel  -     Celiac Comprehensive Panel  -     Case Request; Standing  -     Case Request    2. Nausea  and vomiting, intractability of vomiting not specified, unspecified vomiting type  Overview:  Added automatically from request for surgery [id number]    Orders:  -     Case Request; Standing  -     Case Request    3. Diarrhea, unspecified type  Overview:  Added automatically from request for surgery [id number]    Orders:  -     Case Request; Standing  -     Case Request    4. Change in bowel habits  Overview:  Added automatically from request for surgery [id number]    Orders:  -     Case Request; Standing  -     Case Request    Other orders  -     pantoprazole (PROTONIX) 40 MG EC tablet; Take 1 tablet by mouth 2 (Two) Times a Day.  Dispense: 180 tablet; Refill: 3       Plan    Arrange bidirectional endoscopic evaluation with Dr. Smith for the above-mentioned symptoms.  Patient considered increased risk secondary to his cardiac issues.  Obtain cardiac clearance for patient undergo procedures which would be done at [facility].  I spoke with Dr. Smith regarding his plan of care.  We recommend procedures to be done on Plavix with the caveat that large polyps could not be removed however small biopsies would be safe.  Increase Protonix to twice daily dosing.  Labs today as above.  Further recommendations and follow-up pending the aforementioned work-up.           ESTRELLA Weber  [facility], [facility]  [address] [place] [address]  Office: [phone]

## 2021-11-15 NOTE — TELEPHONE ENCOUNTER
violet Delgado for 02/25 arrive at 1000-egd,cs/patient procedure packet in office on 11/15, pt was advised time of arrival is subject to change, [facility]  will call for w/finale time.. WAITING ON CLEARANCE

## 2021-11-15 NOTE — TELEPHONE ENCOUNTER
"Per Thalia, APRN:    \"Update on cardiac clearance.  Dr. Smith does NOT want him to hold Plavix for procedures but he certainly needs cardiac clearance from the congestive heart failure clinic.  If cardiology gives us the greenlight let me know. \"    Updated message sent to Dr. Hinton.    "

## 2021-11-16 LAB
ALBUMIN SERPL-MCNC: 4.3 G/DL (ref 3.8–4.9)
ALBUMIN/GLOB SERPL: 1.2 {RATIO} (ref 1.2–2.2)
ALP SERPL-CCNC: 116 IU/L (ref 44–121)
ALT SERPL-CCNC: 48 IU/L (ref 0–44)
AST SERPL-CCNC: 23 IU/L (ref 0–40)
BASOPHILS # BLD AUTO: 0 X10E3/UL (ref 0–0.2)
BASOPHILS NFR BLD AUTO: 1 %
BILIRUB SERPL-MCNC: 0.4 MG/DL (ref 0–1.2)
BUN SERPL-MCNC: 15 MG/DL (ref 6–24)
BUN/CREAT SERPL: 18 (ref 9–20)
CALCIUM SERPL-MCNC: 9.8 MG/DL (ref 8.7–10.2)
CHLORIDE SERPL-SCNC: 96 MMOL/L (ref 96–106)
CO2 SERPL-SCNC: 24 MMOL/L (ref 20–29)
CREAT SERPL-MCNC: 0.83 MG/DL (ref 0.76–1.27)
ENDOMYSIUM IGA SER QL: NEGATIVE
EOSINOPHIL # BLD AUTO: 0.3 X10E3/UL (ref 0–0.4)
EOSINOPHIL NFR BLD AUTO: 3 %
ERYTHROCYTE [DISTWIDTH] IN BLOOD BY AUTOMATED COUNT: 11.8 % (ref 11.6–15.4)
GLIADIN PEPTIDE IGA SER-ACNC: 20 UNITS (ref 0–19)
GLIADIN PEPTIDE IGG SER-ACNC: 3 UNITS (ref 0–19)
GLOBULIN SER CALC-MCNC: 3.6 G/DL (ref 1.5–4.5)
GLUCOSE SERPL-MCNC: 338 MG/DL (ref 65–99)
HCT VFR BLD AUTO: 46.8 % (ref 37.5–51)
HGB BLD-MCNC: 15.2 G/DL (ref 13–17.7)
IGA SERPL-MCNC: 538 MG/DL (ref 90–386)
IMM GRANULOCYTES # BLD AUTO: 0 X10E3/UL (ref 0–0.1)
IMM GRANULOCYTES NFR BLD AUTO: 0 %
LYMPHOCYTES # BLD AUTO: 2.6 X10E3/UL (ref 0.7–3.1)
LYMPHOCYTES NFR BLD AUTO: 33 %
MCH RBC QN AUTO: 27.1 PG (ref 26.6–33)
MCHC RBC AUTO-ENTMCNC: 32.5 G/DL (ref 31.5–35.7)
MCV RBC AUTO: 83 FL (ref 79–97)
MONOCYTES # BLD AUTO: 0.9 X10E3/UL (ref 0.1–0.9)
MONOCYTES NFR BLD AUTO: 12 %
NEUTROPHILS # BLD AUTO: 4.1 X10E3/UL (ref 1.4–7)
NEUTROPHILS NFR BLD AUTO: 51 %
PLATELET # BLD AUTO: 182 X10E3/UL (ref 150–450)
POTASSIUM SERPL-SCNC: 4.6 MMOL/L (ref 3.5–5.2)
PROT SERPL-MCNC: 7.9 G/DL (ref 6–8.5)
RBC # BLD AUTO: 5.61 X10E6/UL (ref 4.14–5.8)
SODIUM SERPL-SCNC: 133 MMOL/L (ref 134–144)
TTG IGA SER-ACNC: <2 U/ML (ref 0–3)
TTG IGG SER-ACNC: 5 U/ML (ref 0–5)
WBC # BLD AUTO: 7.9 X10E3/UL (ref 3.4–10.8)

## 2021-11-17 NOTE — PROGRESS NOTES
Please inform the patient, you will have to use the  line, that his lab work is concerning for celiac disease.  Do not make changes to diet yet as we will await endoscopic findings.  Blood sugar quite elevated at 338, see PCP or endocrinologist about that.    Looks like we are still waiting to hear back from his cardiologist about endoscopic clearance.

## 2021-11-18 ENCOUNTER — TELEPHONE (OUTPATIENT)
Dept: GASTROENTEROLOGY | Facility: CLINIC | Age: 56
End: 2021-11-18

## 2021-11-18 NOTE — TELEPHONE ENCOUNTER
----- Message from ESTRELLA Weber sent at 11/17/2021  4:35 PM EST -----  Please inform the patient, you will have to use the  line, that his lab work is concerning for celiac disease.  Do not make changes to diet yet as we will await endoscopic findings.  Blood sugar quite elevated at 338, see PCP or endocrinol  ogist about that.    Looks like we are still waiting to hear back from his cardiologist about endoscopic clearance.

## 2021-11-18 NOTE — TELEPHONE ENCOUNTER
Patient called using [employer] #[phone].  Advised as per Thalia's note. He verb understanding.

## 2022-02-01 ENCOUNTER — TELEPHONE (OUTPATIENT)
Dept: GASTROENTEROLOGY | Facility: CLINIC | Age: 57
End: 2022-02-01

## 2022-02-01 NOTE — TELEPHONE ENCOUNTER
Called  Dr Cage's office and spoke with Evangelina and advised we have called numerous times trying to get cardiac clearance for pt to have upcoming egd and c/s. Advised we do not need pt to hold anti-coags.    She states she will get message to her MD's and will get back with us.  Call back number left .   [phone]

## 2022-02-02 NOTE — TELEPHONE ENCOUNTER
Called Dr Scruggs's office at [phone] and spoke with Sera and who is going to send another note to see if she can get this done.

## 2022-02-11 NOTE — TELEPHONE ENCOUNTER
Thalia Ann, ESTRELLA  [facility] Clinical 2 Pool  Caller: Unspecified (2 months ago)  Please relay to the patient that scopes will be diagnostic in nature we will not be able to remove polyps while on anticoagulation

## 2022-02-11 NOTE — TELEPHONE ENCOUNTER
Thalia Ann, ESTRELLA  [facility] Clinical 2 Pool  Caller: Unspecified (2 months ago)  Please relay to the patient that scopes will be diagnostic in nature we will not be able to remove polyps while on anticoagulation

## 2022-02-11 NOTE — TELEPHONE ENCOUNTER
Called pt using [employer]  # [phone] and advised of Thalia PAYAN's note. Gave pt date and time of procedure and advised will mail out prep instructions. Pt verbalized understanding.

## 2022-02-21 ENCOUNTER — TRANSCRIBE ORDERS (OUTPATIENT)
Dept: ADMINISTRATIVE | Facility: HOSPITAL | Age: 57
End: 2022-02-21

## 2022-02-21 ENCOUNTER — TELEPHONE (OUTPATIENT)
Dept: GASTROENTEROLOGY | Facility: CLINIC | Age: 57
End: 2022-02-21

## 2022-02-21 DIAGNOSIS — Z01.818 OTHER SPECIFIED PRE-OPERATIVE EXAMINATION: Primary | ICD-10-CM

## 2022-02-21 DIAGNOSIS — Z01.818 PRE-OP TESTING: Primary | ICD-10-CM

## 2022-02-23 ENCOUNTER — LAB (OUTPATIENT)
Dept: LAB | Facility: HOSPITAL | Age: 57
End: 2022-02-23

## 2022-02-23 DIAGNOSIS — Z01.818 OTHER SPECIFIED PRE-OPERATIVE EXAMINATION: ICD-10-CM

## 2022-02-23 LAB — SARS-COV-2 ORF1AB RESP QL NAA+PROBE: NOT DETECTED

## 2022-02-23 PROCEDURE — U0004 COV-19 TEST NON-CDC HGH THRU: HCPCS

## 2022-02-23 PROCEDURE — C9803 HOPD COVID-19 SPEC COLLECT: HCPCS

## 2022-02-25 ENCOUNTER — ANESTHESIA (OUTPATIENT)
Dept: GASTROENTEROLOGY | Facility: HOSPITAL | Age: 57
End: 2022-02-25

## 2022-02-25 ENCOUNTER — ANESTHESIA EVENT (OUTPATIENT)
Dept: GASTROENTEROLOGY | Facility: HOSPITAL | Age: 57
End: 2022-02-25

## 2022-02-25 ENCOUNTER — HOSPITAL ENCOUNTER (OUTPATIENT)
Facility: HOSPITAL | Age: 57
Setting detail: HOSPITAL OUTPATIENT SURGERY
Discharge: HOME | End: 2022-02-25
Attending: INTERNAL MEDICINE | Admitting: INTERNAL MEDICINE

## 2022-02-25 VITALS
DIASTOLIC BLOOD PRESSURE: 74 MMHG | TEMPERATURE: 97.8 F | HEIGHT: 67 IN | HEART RATE: 89 BPM | SYSTOLIC BLOOD PRESSURE: 103 MMHG | OXYGEN SATURATION: 96 % | RESPIRATION RATE: 17 BRPM | BODY MASS INDEX: 30.29 KG/M2 | WEIGHT: 193 LBS

## 2022-02-25 DIAGNOSIS — R10.10 PAIN OF UPPER ABDOMEN: ICD-10-CM

## 2022-02-25 DIAGNOSIS — R19.7 DIARRHEA, UNSPECIFIED TYPE: ICD-10-CM

## 2022-02-25 DIAGNOSIS — R11.2 NAUSEA AND VOMITING, INTRACTABILITY OF VOMITING NOT SPECIFIED, UNSPECIFIED VOMITING TYPE: ICD-10-CM

## 2022-02-25 DIAGNOSIS — R19.4 CHANGE IN BOWEL HABITS: ICD-10-CM

## 2022-02-25 LAB — GLUCOSE BLDC GLUCOMTR-MCNC: 182 MG/DL (ref 70–130)

## 2022-02-25 PROCEDURE — 82962 GLUCOSE BLOOD TEST: CPT

## 2022-02-25 PROCEDURE — 43235 EGD DIAGNOSTIC BRUSH WASH: CPT | Performed by: INTERNAL MEDICINE

## 2022-02-25 PROCEDURE — 45378 DIAGNOSTIC COLONOSCOPY: CPT | Performed by: INTERNAL MEDICINE

## 2022-02-25 PROCEDURE — 25010000002 PROPOFOL 10 MG/ML EMULSION: Performed by: ANESTHESIOLOGY

## 2022-02-25 PROCEDURE — S0260 H&P FOR SURGERY: HCPCS | Performed by: INTERNAL MEDICINE

## 2022-02-25 RX ORDER — SODIUM CHLORIDE, SODIUM LACTATE, POTASSIUM CHLORIDE, CALCIUM CHLORIDE 600; 310; 30; 20 MG/100ML; MG/100ML; MG/100ML; MG/100ML
INJECTION, SOLUTION INTRAVENOUS CONTINUOUS PRN
Status: DISCONTINUED | OUTPATIENT
Start: 2022-02-25 | End: 2022-02-25 | Stop reason: SURG

## 2022-02-25 RX ORDER — LIDOCAINE HYDROCHLORIDE 20 MG/ML
INJECTION, SOLUTION INFILTRATION; PERINEURAL AS NEEDED
Status: DISCONTINUED | OUTPATIENT
Start: 2022-02-25 | End: 2022-02-25 | Stop reason: SURG

## 2022-02-25 RX ORDER — SODIUM CHLORIDE 0.9 % (FLUSH) 0.9 %
10 SYRINGE (ML) INJECTION AS NEEDED
Status: DISCONTINUED | OUTPATIENT
Start: 2022-02-25 | End: 2022-02-25 | Stop reason: HOSPADM

## 2022-02-25 RX ORDER — PROPOFOL 10 MG/ML
VIAL (ML) INTRAVENOUS AS NEEDED
Status: DISCONTINUED | OUTPATIENT
Start: 2022-02-25 | End: 2022-02-25 | Stop reason: SURG

## 2022-02-25 RX ORDER — SODIUM CHLORIDE, SODIUM LACTATE, POTASSIUM CHLORIDE, CALCIUM CHLORIDE 600; 310; 30; 20 MG/100ML; MG/100ML; MG/100ML; MG/100ML
30 INJECTION, SOLUTION INTRAVENOUS CONTINUOUS PRN
Status: DISCONTINUED | OUTPATIENT
Start: 2022-02-25 | End: 2022-02-25 | Stop reason: HOSPADM

## 2022-02-25 RX ORDER — PROPOFOL 10 MG/ML
VIAL (ML) INTRAVENOUS CONTINUOUS PRN
Status: DISCONTINUED | OUTPATIENT
Start: 2022-02-25 | End: 2022-02-25 | Stop reason: SURG

## 2022-02-25 RX ADMIN — SODIUM CHLORIDE, POTASSIUM CHLORIDE, SODIUM LACTATE AND CALCIUM CHLORIDE: 600; 310; 30; 20 INJECTION, SOLUTION INTRAVENOUS at 11:03

## 2022-02-25 RX ADMIN — LIDOCAINE HYDROCHLORIDE 60 MG: 20 INJECTION, SOLUTION INFILTRATION; PERINEURAL at 11:06

## 2022-02-25 RX ADMIN — Medication 200 MCG/KG/MIN: at 11:09

## 2022-02-25 RX ADMIN — PROPOFOL 60 MG: 10 INJECTION, EMULSION INTRAVENOUS at 11:09

## 2022-02-25 NOTE — ANESTHESIA POSTPROCEDURE EVALUATION
"Patient: Kenisha Esteban    Procedure Summary     Date: 02/25/22 Room / Location: [facility] ENDOSCOPY 8 / [facility] ENDOSCOPY    Anesthesia Start: 1103 Anesthesia Stop: 1129    Procedures:       ESOPHAGOGASTRODUODENOSCOPY (N/A Esophagus)      COLONOSCOPY TO CECUM & TI. (N/A ) Diagnosis:       Pain of upper abdomen      Nausea and vomiting, intractability of vomiting not specified, unspecified vomiting type      Diarrhea, unspecified type      Change in bowel habits      (Pain of upper abdomen [R10.10])      (Nausea and vomiting, intractability of vomiting not specified, unspecified vomiting type [R11.2])      (Diarrhea, unspecified type [R19.7])      (Change in bowel habits [R19.4])    Surgeons: Brian Smith MD Provider: Libia Valles MD    Anesthesia Type: MAC ASA Status: 4          Anesthesia Type: MAC    Vitals  Vitals Value Taken Time   /74 02/25/22 1149   Temp     Pulse 89 02/25/22 1149   Resp 17 02/25/22 1149   SpO2 96 % 02/25/22 1149           Post Anesthesia Care and Evaluation    Patient location during evaluation: bedside  Patient participation: complete - patient participated  Level of consciousness: awake  Pain management: adequate  Airway patency: patent  Anesthetic complications: No anesthetic complications    Cardiovascular status: acceptable  Respiratory status: acceptable  Hydration status: acceptable    Comments: /74 (BP Location: Left arm, Patient Position: Sitting)   Pulse 89   Temp 36.6 °C (97.8 °F) (Oral)   Resp 17   Ht 170.2 cm (67\")   Wt 87.5 kg (193 lb)   SpO2 96%   BMI 30.23 kg/m²       "

## 2022-02-25 NOTE — ANESTHESIA PREPROCEDURE EVALUATION
Anesthesia Evaluation     NPO Solid Status: > 8 hours  NPO Liquid Status: > 2 hours           Airway   Mallampati: II  TM distance: >3 FB  Dental    (+) edentulous    Pulmonary - normal exam   (+) a smoker Former, COPD,   Cardiovascular - normal exam  Exercise tolerance: poor (<4 METS)    ECG reviewed  Patient on routine beta blocker and Beta blocker given within 24 hours of surgery    (+) hypertension poorly controlled 2 medications or greater, past MI  3-6 months, cardiac stents Drug eluting stent within the past 12 months CHF Systolic <55%, hyperlipidemia,     ROS comment: EF 20% per patient  Left life vest at home, it has never discharged  Still on dual antiplatelet therapy per cardiology-could not be discontinued     Neuro/Psych  (+) poor historian.,    GI/Hepatic/Renal/Endo    (+) obesity,   diabetes mellitus type 2 poorly controlled,     Musculoskeletal     Abdominal    Substance History      OB/GYN          Other                      Anesthesia Plan    ASA 4     MAC   ( used for history and consent.)  intravenous induction     Anesthetic plan, all risks, benefits, and alternatives have been provided, discussed and informed consent has been obtained with: patient.        CODE STATUS:

## (undated) DEVICE — BITEBLOCK OMNI BLOC

## (undated) DEVICE — TUBING, SUCTION, 1/4" X 10', STRAIGHT: Brand: MEDLINE

## (undated) DEVICE — KT ORCA ORCAPOD DISP STRL

## (undated) DEVICE — ADAPT CLN BIOGUARD AIR/H2O DISP

## (undated) DEVICE — CANN O2 ETCO2 FITS ALL CONN CO2 SMPL A/ 7IN DISP LF

## (undated) DEVICE — LN SMPL CO2 SHTRM SD STREAM W/M LUER

## (undated) DEVICE — SENSR O2 OXIMAX FNGR A/ 18IN NONSTR